# Patient Record
Sex: FEMALE | Race: WHITE
[De-identification: names, ages, dates, MRNs, and addresses within clinical notes are randomized per-mention and may not be internally consistent; named-entity substitution may affect disease eponyms.]

---

## 2021-01-28 ENCOUNTER — HOSPITAL ENCOUNTER (OUTPATIENT)
Dept: HOSPITAL 63 - MAMMO | Age: 76
End: 2021-01-28
Attending: FAMILY MEDICINE
Payer: MEDICARE

## 2021-01-28 DIAGNOSIS — Z12.31: Primary | ICD-10-CM

## 2021-01-28 PROCEDURE — 77067 SCR MAMMO BI INCL CAD: CPT

## 2021-01-29 NOTE — RAD
EXAMINATION: MG 2D BILAT SCREENING



CLINICAL HISTORY: Routine screening, history of benign biopsy on the right



TECHNIQUE: Digital craniocaudal and mediolateral oblique views of the bilateral breasts obtained with
 additional exaggerated lateral craniocaudal view of the right breast.



COMPARISON: 1/21/2020, 1/11/2019, 12/20/2017



BREAST COMPOSITION: There are scattered areas of fibroglandular density.





FINDINGS:  



No evidence of suspicious mass, calcifications, or areas of architectural distortion.





IMPRESSION:  



No mammographic evidence of malignancy.



BI-RADS ASSESSMENT:  



Category 1: Negative



RECOMMENDATION:  



Return for routine bilateral screening mammogram in one year.





[employer] compliance statement -  Patient information was entered into a reminder system with a target due
 date for the next mammogram. 



"Our facility is accredited by the [employer] Mammography Program."



Electronically signed by: Yogesh Dockery DO (1/29/2021 3:59 PM) [id number]

## 2022-02-10 ENCOUNTER — HOSPITAL ENCOUNTER (OUTPATIENT)
Dept: HOSPITAL 63 - MAMMO | Age: 77
End: 2022-02-10
Attending: FAMILY MEDICINE
Payer: MEDICARE

## 2022-02-10 DIAGNOSIS — Z12.31: Primary | ICD-10-CM

## 2022-02-10 PROCEDURE — 77067 SCR MAMMO BI INCL CAD: CPT

## 2022-02-10 NOTE — RAD
Digital Mammogram Bilateral



History:  Routine screening



Technique:   2-D digital CC and MLO views were obtained.   CAD - computer aided detection was utilize
d. 



Comparison:  Mammograms from 1/28/2021, 1/21/2020, and 1/11/2019..



Findings:

Breast Tissue Density B : There are scattered areas of fibroglandular density



There are no new suspicious masses, malignant appearing calcifications, or areas of architectural dis
tortion. There are small focal asymmetries in both breasts which are not definitely changed.



Impression:

No evidence of malignancy.



Assessment: BI-RADS 2. Benign findings.



Recommendation: Routine screening mammograms.



The patient will receive a letter with the results in the mail. Patient information will be entered i
nto the mammography reminder system with a target recall date for the next mammogram. A reminder liya
er will be generated.



Electronically signed by: Chaya Polk MD (2/10/2022 5:00 PM) [id number]

## 2022-02-28 ENCOUNTER — HOSPITAL ENCOUNTER (OUTPATIENT)
Dept: HOSPITAL 63 - US | Age: 77
End: 2022-02-28
Attending: FAMILY MEDICINE
Payer: MEDICARE

## 2022-02-28 DIAGNOSIS — K82.8: ICD-10-CM

## 2022-02-28 DIAGNOSIS — R74.8: ICD-10-CM

## 2022-02-28 DIAGNOSIS — K76.0: Primary | ICD-10-CM

## 2022-02-28 PROCEDURE — 76700 US EXAM ABDOM COMPLETE: CPT

## 2022-02-28 NOTE — RAD
EXAM: ULTRASOUND ABDOMEN COMPLETE



CLINICAL HISTORY: Reason: ELEVATED LIVER ENZYMES / Spl. Instructions:  / History: 



COMPARISON: None available.



TECHNIQUE: Ultrasound of the upper abdomen was performed.



FINDINGS:



The visualized aorta, IVC within normal limits of dimension. Pancreas is not well-visualized due to b
owel gas. The liver length measures 17.6 cm. Moderate increased echogenicity identified in the liver 
likely hepatic steatosis. The gallbladder is mildly distended. No evidence of gallstones. The right k
idney is 9.2 x 4.8 x 4.9 cm. The left kidney measures 10.7 x 5.8 x 5.2 cm.



The spleen measures 9.2 cm in length.



IMPRESSION:



1. Examination limited due to bowel gas.



2. Moderate hepatic steatosis.



Electronically signed by: David Hope MD (2/28/2022 9:27 AM) [id number]